# Patient Record
Sex: FEMALE | URBAN - METROPOLITAN AREA
[De-identification: names, ages, dates, MRNs, and addresses within clinical notes are randomized per-mention and may not be internally consistent; named-entity substitution may affect disease eponyms.]

---

## 2020-12-02 ENCOUNTER — APPOINTMENT (RX ONLY)
Dept: URBAN - METROPOLITAN AREA CLINIC 86 | Facility: CLINIC | Age: 40
Setting detail: DERMATOLOGY
End: 2020-12-02

## 2020-12-02 VITALS — WEIGHT: 225 LBS | TEMPERATURE: 97.1 F | HEIGHT: 66 IN

## 2020-12-02 DIAGNOSIS — L20.89 OTHER ATOPIC DERMATITIS: ICD-10-CM

## 2020-12-02 PROBLEM — L20.84 INTRINSIC (ALLERGIC) ECZEMA: Status: ACTIVE | Noted: 2020-12-02

## 2020-12-02 PROCEDURE — ? INTRAMUSCULAR KENALOG

## 2020-12-02 PROCEDURE — ? SEPARATE AND IDENTIFIABLE DOCUMENTATION

## 2020-12-02 PROCEDURE — ? COUNSELING

## 2020-12-02 PROCEDURE — ? TREATMENT REGIMEN

## 2020-12-02 PROCEDURE — ? PRESCRIPTION

## 2020-12-02 PROCEDURE — 99202 OFFICE O/P NEW SF 15 MIN: CPT | Mod: 25

## 2020-12-02 PROCEDURE — 96372 THER/PROPH/DIAG INJ SC/IM: CPT

## 2020-12-02 RX ORDER — CLOBETASOL PROPIONATE 0.5 MG/G
CREAM TOPICAL TWICE A DAY
Qty: 1 | Refills: 0 | Status: ERX | COMMUNITY
Start: 2020-12-02

## 2020-12-02 RX ORDER — FLUOCINONIDE 0.5 MG/G
CREAM TOPICAL
Qty: 1 | Refills: 0 | Status: ERX | COMMUNITY
Start: 2020-12-02

## 2020-12-02 RX ADMIN — CLOBETASOL PROPIONATE: 0.5 CREAM TOPICAL at 00:00

## 2020-12-02 RX ADMIN — FLUOCINONIDE: 0.5 CREAM TOPICAL at 00:00

## 2020-12-02 ASSESSMENT — LOCATION SIMPLE DESCRIPTION DERM
LOCATION SIMPLE: LEFT THIGH
LOCATION SIMPLE: LEFT PRETIBIAL REGION
LOCATION SIMPLE: RIGHT PRETIBIAL REGION
LOCATION SIMPLE: RIGHT POPLITEAL SKIN
LOCATION SIMPLE: LEFT POPLITEAL SKIN
LOCATION SIMPLE: RIGHT BUTTOCK
LOCATION SIMPLE: RIGHT THIGH

## 2020-12-02 ASSESSMENT — LOCATION DETAILED DESCRIPTION DERM
LOCATION DETAILED: LEFT POPLITEAL SKIN
LOCATION DETAILED: RIGHT PROXIMAL PRETIBIAL REGION
LOCATION DETAILED: LEFT ANTERIOR PROXIMAL THIGH
LOCATION DETAILED: RIGHT ANTERIOR PROXIMAL THIGH
LOCATION DETAILED: RIGHT POPLITEAL SKIN
LOCATION DETAILED: LEFT PROXIMAL PRETIBIAL REGION
LOCATION DETAILED: RIGHT BUTTOCK

## 2020-12-02 ASSESSMENT — SEVERITY ASSESSMENT 2020: SEVERITY 2020: MODERATE

## 2020-12-02 ASSESSMENT — LOCATION ZONE DERM
LOCATION ZONE: TRUNK
LOCATION ZONE: LEG

## 2020-12-02 NOTE — PROCEDURE: INTRAMUSCULAR KENALOG
Administered By (Optional): Aleah Alvarado
Consent: The risks of atrophy were reviewed with the patient.
Detail Level: None
Concentration (Mg/Ml) Of Additional Medication: 2.5
Total Volume (Ccs): 2
Kenalog Preparation: kenalog
Add Option For Additional Mediation: No
Concentration (Mg/Ml): 25.0

## 2020-12-02 NOTE — PROCEDURE: TREATMENT REGIMEN
Detail Level: Zone
Initiate Treatment: Clobetasol .05% cream apply bid to affected areas for 2 weeks or less\\nFluocinonide .05% cream apply bid to affected areas for 2 weeks or less

## 2021-02-26 ENCOUNTER — APPOINTMENT (RX ONLY)
Dept: URBAN - METROPOLITAN AREA CLINIC 86 | Facility: CLINIC | Age: 41
Setting detail: DERMATOLOGY
End: 2021-02-26

## 2021-02-26 VITALS — TEMPERATURE: 97.2 F | HEIGHT: 66 IN | WEIGHT: 225 LBS

## 2021-02-26 DIAGNOSIS — L20.89 OTHER ATOPIC DERMATITIS: ICD-10-CM | Status: INADEQUATELY CONTROLLED

## 2021-02-26 PROCEDURE — 99214 OFFICE O/P EST MOD 30 MIN: CPT

## 2021-02-26 PROCEDURE — ? PRESCRIPTION MEDICATION MANAGEMENT

## 2021-02-26 PROCEDURE — ? PRESCRIPTION

## 2021-02-26 PROCEDURE — ? ORDER TESTS

## 2021-02-26 PROCEDURE — ? COUNSELING

## 2021-02-26 RX ORDER — PREDNISONE 20 MG/1
TABLET ORAL
Qty: 21 | Refills: 0 | Status: ERX | COMMUNITY
Start: 2021-02-26

## 2021-02-26 RX ADMIN — PREDNISONE: 20 TABLET ORAL at 00:00

## 2021-02-26 ASSESSMENT — SEVERITY ASSESSMENT 2020: SEVERITY 2020: SEVERE

## 2021-02-26 ASSESSMENT — LOCATION DETAILED DESCRIPTION DERM
LOCATION DETAILED: EPIGASTRIC SKIN
LOCATION DETAILED: RIGHT MEDIAL FRONTAL SCALP

## 2021-02-26 ASSESSMENT — BSA ECZEMA: % BODY COVERED IN ECZEMA: 20

## 2021-02-26 ASSESSMENT — LOCATION ZONE DERM
LOCATION ZONE: SCALP
LOCATION ZONE: TRUNK

## 2021-02-26 ASSESSMENT — LOCATION SIMPLE DESCRIPTION DERM
LOCATION SIMPLE: ABDOMEN
LOCATION SIMPLE: RIGHT SCALP

## 2021-02-26 NOTE — PROCEDURE: PRESCRIPTION MEDICATION MANAGEMENT
Continue Regimen: Fluocinonide prn\\nClobetasol prn
Plan: Patient will get labs done to start Dupixent.
Render In Strict Bullet Format?: No
Detail Level: Zone
Initiate Treatment: Prednisone 20 mg take 3 po qam for 2 days, 2.5 po qam for 2 days, 2 po qam for 2 days, 1.5 po qam for 2 days, 1 po qam for 2 days then .5 po qam for 2 days